# Patient Record
Sex: FEMALE | Race: WHITE | URBAN - METROPOLITAN AREA
[De-identification: names, ages, dates, MRNs, and addresses within clinical notes are randomized per-mention and may not be internally consistent; named-entity substitution may affect disease eponyms.]

---

## 2017-10-05 ENCOUNTER — HOSPITAL ENCOUNTER (OUTPATIENT)
Dept: WOMENS IMAGING | Age: 67
Discharge: HOME | End: 2017-10-05
Attending: OBSTETRICS & GYNECOLOGY | Admitting: OBSTETRICS & GYNECOLOGY

## 2017-10-05 DIAGNOSIS — Z12.31 VISIT FOR SCREENING MAMMOGRAM: ICD-10-CM

## 2019-10-17 ENCOUNTER — HOSPITAL ENCOUNTER (OUTPATIENT)
Dept: WOMENS IMAGING | Age: 69
Discharge: HOME | End: 2019-10-17
Payer: MEDICARE

## 2019-10-17 DIAGNOSIS — Z12.31 VISIT FOR SCREENING MAMMOGRAM: ICD-10-CM

## 2019-10-17 PROCEDURE — 77063 BREAST TOMOSYNTHESIS BI: CPT

## 2023-03-21 ENCOUNTER — INITIAL CONSULT (OUTPATIENT)
Dept: SURGERY | Age: 73
End: 2023-03-21
Payer: MEDICARE

## 2023-03-21 VITALS
HEART RATE: 64 BPM | SYSTOLIC BLOOD PRESSURE: 129 MMHG | DIASTOLIC BLOOD PRESSURE: 61 MMHG | WEIGHT: 196 LBS | BODY MASS INDEX: 30.76 KG/M2 | HEIGHT: 67 IN

## 2023-03-21 DIAGNOSIS — K80.20 ASYMPTOMATIC CHOLELITHIASIS: Primary | ICD-10-CM

## 2023-03-21 PROCEDURE — G8417 CALC BMI ABV UP PARAM F/U: HCPCS | Performed by: SURGERY

## 2023-03-21 PROCEDURE — G8427 DOCREV CUR MEDS BY ELIG CLIN: HCPCS | Performed by: SURGERY

## 2023-03-21 PROCEDURE — G8484 FLU IMMUNIZE NO ADMIN: HCPCS | Performed by: SURGERY

## 2023-03-21 PROCEDURE — 99203 OFFICE O/P NEW LOW 30 MIN: CPT | Performed by: SURGERY

## 2023-03-21 PROCEDURE — 1090F PRES/ABSN URINE INCON ASSESS: CPT | Performed by: SURGERY

## 2023-03-21 NOTE — PROGRESS NOTES
Pulse: 64   Weight: 196 lb (88.9 kg)   Height: 5' 6.5\" (1.689 m)     Body mass index is 31.16 kg/m². Wt Readings from Last 3 Encounters:   03/21/23 196 lb (88.9 kg)   10/18/10 220 lb (99.8 kg)     BP Readings from Last 3 Encounters:   03/21/23 129/61   10/18/10 136/86          REVIEW OF SYSTEMS:   All other systems reviewed; please refer to HPI with pertinent positives, all other ROS are negative    PHYSICAL EXAM:    CONSTITUTIONAL:  awake, alert, no apparent distress and mildly obese  ENT:  normocepalic, without obvious abnormality  NECK:  supple, symmetrical, trachea midline  LUNGS:  Normal respiratory rate and effort. ABDOMEN:  soft, non-distended, non-tender, guarding absent, no masses palpated and hernia absent. Negative Arrieta's sign. MUSCULOSKELETAL:  No pitting edema in the lower extremities  NEUROLOGIC:  Mental Status Exam:  Level of Alertness:   awake  Orientation:   person, place, time      DATA:  Radiology Review:  Results from [facility] were reviewed from 2/9/23. Assessment:  1. Asymptomatic cholelithiasis          Plan:   - Patient has been asymptomatic for the last 2.5 months.   - No immediate indication for surgical intervention at this time. - Told patient to call back if symptoms worsen to schedule elective laparoscopic cholecystectomy, if indicated. - RTC as needed or sooner if symptoms return. Surgery Staff    I have examined this patient, and read and agree with the note by Cory Luke DO from today; more than half of the total time was spent by me on the encounter. Isolated episode of probable biliary colic, none since then. Patient wishes to hold on surgical intervention for now. She knows what to watch for - if she has recurrent symptoms she will contact our office to schedule elective cholecystectomy.     Murphy Weber MD